# Patient Record
Sex: MALE | Race: WHITE
[De-identification: names, ages, dates, MRNs, and addresses within clinical notes are randomized per-mention and may not be internally consistent; named-entity substitution may affect disease eponyms.]

---

## 2017-01-17 ENCOUNTER — HOSPITAL ENCOUNTER (OUTPATIENT)
Dept: HOSPITAL 53 - M LAB | Age: 67
End: 2017-01-17
Attending: INTERNAL MEDICINE
Payer: COMMERCIAL

## 2017-01-17 DIAGNOSIS — L12.1: Primary | ICD-10-CM

## 2017-01-17 LAB
ALBUMIN SERPL BCG-MCNC: 3.6 GM/DL (ref 3.2–5.2)
ALP SERPL-CCNC: 103 U/L (ref 45–117)
ALT SERPL W P-5'-P-CCNC: 23 U/L (ref 12–78)
AST SERPL-CCNC: 10 U/L (ref 15–37)
BASOPHILS # BLD AUTO: 0.1 K/MM3 (ref 0–0.2)
BASOPHILS NFR BLD AUTO: 0.9 % (ref 0–1)
BUN SERPL-MCNC: 26 MG/DL (ref 7–18)
CREAT SERPL-MCNC: 1.46 MG/DL (ref 0.7–1.3)
EOSINOPHIL # BLD AUTO: 0.2 K/MM3 (ref 0–0.5)
EOSINOPHIL NFR BLD AUTO: 2.6 % (ref 0–3)
ERYTHROCYTE [DISTWIDTH] IN BLOOD BY AUTOMATED COUNT: 16.4 % (ref 11.5–14.5)
ERYTHROCYTE [SEDIMENTATION RATE] IN BLOOD BY WESTERGREN METHOD: 41 MM/HR (ref 0–20)
GFR SERPL CREATININE-BSD FRML MDRD: 51.3 ML/MIN/{1.73_M2} (ref 49–?)
LYMPHOCYTES # BLD AUTO: 3.4 K/MM3 (ref 1.5–4.5)
LYMPHOCYTES NFR BLD AUTO: 36.6 % (ref 24–44)
MCH RBC QN AUTO: 31.5 PG (ref 27–33)
MCHC RBC AUTO-ENTMCNC: 33.3 G/DL (ref 32–36.5)
MCV RBC AUTO: 94.3 FL (ref 80–96)
MONOCYTES # BLD AUTO: 0.4 K/MM3 (ref 0–0.8)
MONOCYTES NFR BLD AUTO: 3.9 % (ref 0–5)
NEUTROPHILS # BLD AUTO: 4.9 K/MM3 (ref 1.8–7.7)
NEUTROPHILS NFR BLD AUTO: 53.2 % (ref 36–66)
PLATELET # BLD AUTO: 286 K/MM3 (ref 150–450)
WBC # BLD AUTO: 9.2 K/MM3 (ref 4–10)

## 2017-04-06 ENCOUNTER — HOSPITAL ENCOUNTER (OUTPATIENT)
Dept: HOSPITAL 53 - M RAD | Age: 67
End: 2017-04-06
Attending: FAMILY MEDICINE
Payer: COMMERCIAL

## 2017-04-06 DIAGNOSIS — Z12.2: Primary | ICD-10-CM

## 2017-04-06 DIAGNOSIS — F17.210: ICD-10-CM

## 2017-04-06 NOTE — REP
LOW DOSE HELICAL SCREENING LUNG CT:

 

Low dose helical screening lung CT examination is performed in the axial plane.

 

 

Comparison is made with prior CT of the chest 01/19/2012.

 

There is diffuse emphysematous change and scattered interstitial fibrotic

scarring. In the lingular segment of the left upper lobe, inferiorly, there is a

6 mm nodular opacity with somewhat irregular borders.  This is not seen on the

prior study.  There did appear to be some minimal linear scarring at that

location on the prior study.  No other nodules are seen.  There is no pleural or

pericardial effusion. The heart is normal in size. There are degenerative changes

of the spine.

 

IMPRESSION:

 

6 mm nodular opacity in the lingular segment of the left upper lobe.  Recommend

dedicated 6 month followup CT of the chest with and without contrast.

 

 

Signed by

Vinicio Gudino MD 04/06/2017 04:53 P

## 2017-06-22 ENCOUNTER — HOSPITAL ENCOUNTER (OUTPATIENT)
Dept: HOSPITAL 53 - M LAB | Age: 67
End: 2017-06-22
Attending: INTERNAL MEDICINE
Payer: COMMERCIAL

## 2017-06-22 DIAGNOSIS — L12.1: ICD-10-CM

## 2017-06-22 DIAGNOSIS — Z51.81: Primary | ICD-10-CM

## 2017-06-22 DIAGNOSIS — Z79.899: ICD-10-CM

## 2017-06-22 LAB
ALT SERPL W P-5'-P-CCNC: 23 U/L (ref 12–78)
AST SERPL-CCNC: 19 U/L (ref 15–37)
BASOPHILS # BLD AUTO: 0 K/MM3 (ref 0–0.2)
BASOPHILS NFR BLD AUTO: 0.6 % (ref 0–1)
BUN SERPL-MCNC: 22 MG/DL (ref 7–18)
CREAT SERPL-MCNC: 1.33 MG/DL (ref 0.7–1.3)
EOSINOPHIL # BLD AUTO: 0.2 K/MM3 (ref 0–0.5)
EOSINOPHIL NFR BLD AUTO: 4.4 % (ref 0–3)
ERYTHROCYTE [DISTWIDTH] IN BLOOD BY AUTOMATED COUNT: 16.3 % (ref 11.5–14.5)
ERYTHROCYTE [SEDIMENTATION RATE] IN BLOOD BY WESTERGREN METHOD: 49 MM/HR (ref 0–20)
GFR SERPL CREATININE-BSD FRML MDRD: 57.1 ML/MIN/{1.73_M2} (ref 49–?)
LYMPHOCYTES # BLD AUTO: 2.1 K/MM3 (ref 1.5–4.5)
LYMPHOCYTES NFR BLD AUTO: 34.4 % (ref 24–44)
MCH RBC QN AUTO: 32.1 PG (ref 27–33)
MCHC RBC AUTO-ENTMCNC: 32.8 G/DL (ref 32–36.5)
MCV RBC AUTO: 98 FL (ref 80–96)
MONOCYTES # BLD AUTO: 0.4 K/MM3 (ref 0–0.8)
MONOCYTES NFR BLD AUTO: 7.6 % (ref 0–5)
NEUTROPHILS # BLD AUTO: 2.8 K/MM3 (ref 1.8–7.7)
NEUTROPHILS NFR BLD AUTO: 50.4 % (ref 36–66)
PLATELET # BLD AUTO: 274 K/MM3 (ref 150–450)
WBC # BLD AUTO: 5.6 K/MM3 (ref 4–10)

## 2017-08-25 ENCOUNTER — HOSPITAL ENCOUNTER (OUTPATIENT)
Dept: HOSPITAL 53 - M SMT | Age: 67
End: 2017-08-25
Attending: INTERNAL MEDICINE
Payer: COMMERCIAL

## 2017-08-25 DIAGNOSIS — J44.9: ICD-10-CM

## 2017-08-25 DIAGNOSIS — J84.10: ICD-10-CM

## 2017-08-25 DIAGNOSIS — R91.1: Primary | ICD-10-CM

## 2017-08-25 NOTE — REP
Chest x-ray:  Three views.

 

History:  Pulmonary nodule.

 

Comparison chest x-ray September 21, 2016.  Comparison chest CT study April 6, 2017.

 

Findings:  The lungs are hyperinflated consistent with COPD.  Interstitial

markings are diffusely prominent as before consistent with mild interstitial

fibrosis.  Heart is not enlarged.  No hilar or mediastinal mass is seen.  The 6

mm nodule in the lingular segment left upper lobe at the left lung base cannot be

seen radiographically.  CT scanning will be necessary for followup of this

nodule.  The interstitial findings are unchanged from September 21, 2016.  They

are more pronounced than on the July 30, 2014 prior study.

 

Impression:

 

Hyperinflation with diffuse interstitial lung fibrosis pattern.  No pulmonary

nodule can be visualized on plain radiographs.  CT will be necessary for followup

of the previously identified nodule.

 

 

Signed by

Primitivo Morel MD 08/25/2017 11:51 A

## 2017-10-04 ENCOUNTER — HOSPITAL ENCOUNTER (OUTPATIENT)
Dept: HOSPITAL 53 - M LAB | Age: 67
End: 2017-10-04
Attending: PHYSICIAN ASSISTANT
Payer: COMMERCIAL

## 2017-10-04 DIAGNOSIS — R79.89: Primary | ICD-10-CM

## 2017-10-04 LAB
ALBUMIN SERPL BCG-MCNC: 3.1 GM/DL (ref 3.2–5.2)
ALP HEAT LABILE SERPL HS-CCNC: 39 U/L
ALP HEAT STABLE SERPL HS-CCNC: 69 U/L
ALP SERPL-CCNC: 108 U/L (ref 45–117)
ALT SERPL W P-5'-P-CCNC: 19 U/L (ref 12–78)
AST SERPL-CCNC: 10 U/L (ref 15–37)
BUN SERPL-MCNC: 16 MG/DL (ref 7–18)
CREAT SERPL-MCNC: 1.15 MG/DL (ref 0.7–1.3)
GFR SERPL CREATININE-BSD FRML MDRD: > 60 ML/MIN/{1.73_M2} (ref 49–?)
GGT SERPL-CCNC: 15 U/L (ref 15–85)

## 2017-11-29 ENCOUNTER — HOSPITAL ENCOUNTER (OUTPATIENT)
Dept: HOSPITAL 53 - M LAB | Age: 67
End: 2017-11-29
Attending: PHYSICIAN ASSISTANT
Payer: COMMERCIAL

## 2017-11-29 DIAGNOSIS — Z79.899: ICD-10-CM

## 2017-11-29 DIAGNOSIS — L12.1: Primary | ICD-10-CM

## 2017-11-29 LAB
ALBUMIN SERPL BCG-MCNC: 3.2 GM/DL (ref 3.2–5.2)
ALP SERPL-CCNC: 125 U/L (ref 45–117)
ALT SERPL W P-5'-P-CCNC: 20 U/L (ref 12–78)
AST SERPL-CCNC: 12 U/L (ref 7–37)
BASOPHILS # BLD AUTO: 0.1 10^3/UL (ref 0–0.2)
BASOPHILS NFR BLD AUTO: 1 % (ref 0–1)
BUN SERPL-MCNC: 26 MG/DL (ref 7–18)
CREAT SERPL-MCNC: 1.22 MG/DL (ref 0.7–1.3)
EOSINOPHIL # BLD AUTO: 0.2 10^3/UL (ref 0–0.5)
EOSINOPHIL NFR BLD AUTO: 2.5 % (ref 0–3)
ERYTHROCYTE [DISTWIDTH] IN BLOOD BY AUTOMATED COUNT: 16.3 % (ref 11.5–14.5)
GFR SERPL CREATININE-BSD FRML MDRD: > 60 ML/MIN/{1.73_M2} (ref 49–?)
IMM GRANULOCYTES NFR BLD: 0.4 % (ref 0–0)
LYMPHOCYTES # BLD AUTO: 3.4 10^3/UL (ref 1.5–4.5)
LYMPHOCYTES NFR BLD AUTO: 40.9 % (ref 24–44)
MCH RBC QN AUTO: 30 PG (ref 27–33)
MCHC RBC AUTO-ENTMCNC: 32.1 G/DL (ref 32–36.5)
MCV RBC AUTO: 93.3 FL (ref 80–96)
MONOCYTES # BLD AUTO: 0.6 10^3/UL (ref 0–0.8)
MONOCYTES NFR BLD AUTO: 7.1 % (ref 0–5)
NEUTROPHILS # BLD AUTO: 4 10^3/UL (ref 1.8–7.7)
NEUTROPHILS NFR BLD AUTO: 48.1 % (ref 36–66)
NRBC BLD AUTO-RTO: 0 % (ref 0–0)
PLATELET # BLD AUTO: 281 10^3/UL (ref 150–450)
WBC # BLD AUTO: 8.3 10^3/UL (ref 4–10)

## 2017-12-01 LAB — ERYTHROCYTE [SEDIMENTATION RATE] IN BLOOD BY WESTERGREN METHOD: 24 MM/HR (ref 0–20)

## 2018-01-25 ENCOUNTER — HOSPITAL ENCOUNTER (OUTPATIENT)
Dept: HOSPITAL 53 - M SDC | Age: 68
Discharge: HOME | End: 2018-01-25
Attending: OPHTHALMOLOGY
Payer: COMMERCIAL

## 2018-01-25 DIAGNOSIS — H25.12: Primary | ICD-10-CM

## 2018-01-25 DIAGNOSIS — Z87.891: ICD-10-CM

## 2018-01-25 DIAGNOSIS — L12.1: ICD-10-CM

## 2018-01-25 DIAGNOSIS — K21.9: ICD-10-CM

## 2018-01-25 DIAGNOSIS — H35.30: ICD-10-CM

## 2018-01-25 DIAGNOSIS — Z79.82: ICD-10-CM

## 2018-01-25 DIAGNOSIS — Z88.8: ICD-10-CM

## 2018-01-25 DIAGNOSIS — R41.3: ICD-10-CM

## 2018-01-25 DIAGNOSIS — Z95.820: ICD-10-CM

## 2018-01-25 DIAGNOSIS — G47.33: ICD-10-CM

## 2018-01-25 DIAGNOSIS — Z86.73: ICD-10-CM

## 2018-01-25 DIAGNOSIS — I73.9: ICD-10-CM

## 2018-01-25 DIAGNOSIS — I11.9: ICD-10-CM

## 2018-01-25 DIAGNOSIS — E78.5: ICD-10-CM

## 2018-01-25 DIAGNOSIS — I25.84: ICD-10-CM

## 2018-01-25 DIAGNOSIS — F41.9: ICD-10-CM

## 2018-01-25 DIAGNOSIS — I25.10: ICD-10-CM

## 2018-01-25 DIAGNOSIS — Z79.899: ICD-10-CM

## 2018-01-25 DIAGNOSIS — M19.90: ICD-10-CM

## 2018-01-25 DIAGNOSIS — M54.9: ICD-10-CM

## 2018-01-25 DIAGNOSIS — F31.9: ICD-10-CM

## 2018-01-25 DIAGNOSIS — J44.9: ICD-10-CM

## 2018-01-25 PROCEDURE — 66984 XCAPSL CTRC RMVL W/O ECP: CPT

## 2018-01-25 RX ADMIN — CEFUROXIME 1 MG: 750 INJECTION, POWDER, FOR SOLUTION INTRAMUSCULAR; INTRAVENOUS at 12:21

## 2018-01-25 RX ADMIN — TROPICAMIDE 1 DROP: 10 SOLUTION/ DROPS OPHTHALMIC at 10:02

## 2018-01-25 RX ADMIN — SODIUM CHONDROITIN SULFATE / SODIUM HYALURONATE 1 EA: 0.55-0.5 INJECTION INTRAOCULAR at 12:21

## 2018-01-25 RX ADMIN — ACETYLCHOLINE CHLORIDE 1 DROP: KIT at 12:21

## 2018-01-25 RX ADMIN — POVIDONE-IODINE 1 DROP: 5 SOLUTION OPHTHALMIC at 12:21

## 2018-01-25 RX ADMIN — PHENYLEPHRINE HYDROCHLORIDE 1 DROP: 25 SOLUTION/ DROPS OPHTHALMIC at 10:01

## 2018-01-25 RX ADMIN — Medication 1 ML: at 12:21

## 2018-01-25 RX ADMIN — BALANCED SALT SOLUTION 1 ML: 6.4; .75; .48; .3; 3.9; 1.7 SOLUTION OPHTHALMIC at 12:21

## 2018-01-25 RX ADMIN — PROPARACAINE HYDROCHLORIDE 1 DROP: 5 SOLUTION/ DROPS OPHTHALMIC at 10:01

## 2018-01-25 RX ADMIN — OFLOXACIN 1 DROP: 3 SOLUTION/ DROPS OPHTHALMIC at 10:02

## 2018-02-24 ENCOUNTER — HOSPITAL ENCOUNTER (OUTPATIENT)
Dept: HOSPITAL 53 - M LAB | Age: 68
End: 2018-02-24
Payer: COMMERCIAL

## 2018-02-24 DIAGNOSIS — Z79.899: ICD-10-CM

## 2018-02-24 DIAGNOSIS — L12.1: Primary | ICD-10-CM

## 2018-02-24 LAB
ALBUMIN: 3.5 GM/DL (ref 3.2–5.2)
ALKALINE PHOSPHATASE: 108 U/L (ref 45–117)
ALT SERPL W P-5'-P-CCNC: 27 U/L (ref 12–78)
AST SERPL-CCNC: 17 U/L (ref 7–37)
BASO #: 0.1 10^3/UL (ref 0–0.2)
BASO %: 0.8 % (ref 0–1)
BLOOD UREA NITROGEN: 29 MG/DL (ref 7–18)
CREATININE FOR GFR: 1.28 MG/DL (ref 0.7–1.3)
CRP SERPL-MCNC: 0.5 MG/DL (ref 0–0.3)
EOS #: 0.3 10^3/UL (ref 0–0.5)
EOSINOPHIL NFR BLD AUTO: 2.7 % (ref 0–3)
ERYTHROCYTE SEDIMENTATION RATE: 44 MM/HR (ref 0–20)
GFR SERPL CREATININE-BSD FRML MDRD: 59.5 ML/MIN/{1.73_M2} (ref 49–?)
HEMATOCRIT: 37.2 % (ref 42–52)
HEMOGLOBIN: 11.8 G/DL (ref 14–18)
IMMATURE GRANULOCYTE #: 0 10^3/UL (ref 0–0)
IMMATURE GRANULOCYTE %: 0.4 % (ref 0–3)
LYMPH #: 2.8 10^3/UL (ref 1.5–4.5)
LYMPH %: 30.2 % (ref 24–44)
MEAN CORPUSCULAR HEMOGLOBIN: 29.9 PG (ref 27–33)
MEAN CORPUSCULAR HGB CONC: 31.7 G/DL (ref 32–36.5)
MEAN CORPUSCULAR VOLUME: 94.2 FL (ref 80–96)
MONO #: 0.5 10^3/UL (ref 0–0.8)
MONO %: 5.8 % (ref 0–5)
NEUTROPHILS #: 5.5 10^3/UL (ref 1.8–7.7)
NEUTROPHILS %: 60.1 % (ref 36–66)
NRBC BLD AUTO-RTO: 0 % (ref 0–0)
PLATELET COUNT, AUTOMATED: 261 10^3/UL (ref 150–450)
RED BLOOD COUNT: 3.95 10^6/UL (ref 4.3–6.1)
RED CELL DISTRIBUTION WIDTH: 16.8 % (ref 11.5–14.5)
WHITE BLOOD COUNT: 9.1 10^3/UL (ref 4–10)

## 2018-03-15 ENCOUNTER — HOSPITAL ENCOUNTER (OUTPATIENT)
Dept: HOSPITAL 53 - M SDC | Age: 68
Discharge: HOME | End: 2018-03-15
Attending: OPHTHALMOLOGY
Payer: MEDICARE

## 2018-03-15 DIAGNOSIS — Z86.73: ICD-10-CM

## 2018-03-15 DIAGNOSIS — H52.211: ICD-10-CM

## 2018-03-15 DIAGNOSIS — F31.9: ICD-10-CM

## 2018-03-15 DIAGNOSIS — J44.9: ICD-10-CM

## 2018-03-15 DIAGNOSIS — Z79.899: ICD-10-CM

## 2018-03-15 DIAGNOSIS — L12.1: ICD-10-CM

## 2018-03-15 DIAGNOSIS — F17.210: ICD-10-CM

## 2018-03-15 DIAGNOSIS — Z79.82: ICD-10-CM

## 2018-03-15 DIAGNOSIS — H25.11: Primary | ICD-10-CM

## 2018-03-15 DIAGNOSIS — K21.9: ICD-10-CM

## 2018-03-15 DIAGNOSIS — H17.9: ICD-10-CM

## 2018-03-15 DIAGNOSIS — E78.5: ICD-10-CM

## 2018-03-15 DIAGNOSIS — I10: ICD-10-CM

## 2018-03-15 PROCEDURE — 66984 XCAPSL CTRC RMVL W/O ECP: CPT

## 2018-03-15 RX ADMIN — BALANCED SALT SOLUTION 1 ML: 6.4; .75; .48; .3; 3.9; 1.7 SOLUTION OPHTHALMIC at 08:08

## 2018-03-15 RX ADMIN — SODIUM CHONDROITIN SULFATE / SODIUM HYALURONATE 1 EA: 0.55-0.5 INJECTION INTRAOCULAR at 08:16

## 2018-03-15 RX ADMIN — PHENYLEPHRINE HYDROCHLORIDE 1 DROP: 25 SOLUTION/ DROPS OPHTHALMIC at 06:58

## 2018-03-15 RX ADMIN — TROPICAMIDE 1 DROP: 10 SOLUTION/ DROPS OPHTHALMIC at 06:58

## 2018-03-15 RX ADMIN — Medication 1 ML: at 08:16

## 2018-03-15 RX ADMIN — CEFUROXIME 1 MG: 750 INJECTION, POWDER, FOR SOLUTION INTRAMUSCULAR; INTRAVENOUS at 06:40

## 2018-03-15 RX ADMIN — OFLOXACIN 1 DROP: 3 SOLUTION/ DROPS OPHTHALMIC at 06:57

## 2018-03-15 RX ADMIN — PROPARACAINE HYDROCHLORIDE 1 DROP: 5 SOLUTION/ DROPS OPHTHALMIC at 06:58

## 2018-04-20 ENCOUNTER — HOSPITAL ENCOUNTER (OUTPATIENT)
Dept: HOSPITAL 53 - M LAB | Age: 68
End: 2018-04-20
Attending: PHYSICIAN ASSISTANT
Payer: MEDICARE

## 2018-04-20 DIAGNOSIS — Z79.899: ICD-10-CM

## 2018-04-20 DIAGNOSIS — L12.1: Primary | ICD-10-CM

## 2018-04-20 LAB
ALBUMIN: 3.5 GM/DL (ref 3.2–5.2)
ALT SERPL W P-5'-P-CCNC: 21 U/L (ref 12–78)
AST SERPL-CCNC: 19 U/L (ref 7–37)
BASO #: 0.1 10^3/UL (ref 0–0.2)
BASO %: 0.5 % (ref 0–1)
BLOOD UREA NITROGEN: 26 MG/DL (ref 7–18)
CREATININE FOR GFR: 1.31 MG/DL (ref 0.7–1.3)
CRP SERPL-MCNC: 0.56 MG/DL (ref 0–0.3)
EOS #: 0.3 10^3/UL (ref 0–0.5)
EOSINOPHIL NFR BLD AUTO: 2.6 % (ref 0–3)
ERYTHROCYTE SEDIMENTATION RATE: 19 MM/HR (ref 0–20)
GFR SERPL CREATININE-BSD FRML MDRD: 57.9 ML/MIN/{1.73_M2} (ref 49–?)
HEMATOCRIT: 36.8 % (ref 42–52)
HEMOGLOBIN: 11.9 G/DL (ref 13.5–17.5)
IMMATURE GRANULOCYTE #: 0 10^3/UL (ref 0–0)
IMMATURE GRANULOCYTE %: 0.4 % (ref 0–3)
LYMPH #: 3.7 10^3/UL (ref 1.5–4.5)
LYMPH %: 34 % (ref 24–44)
MEAN CORPUSCULAR HEMOGLOBIN: 30.1 PG (ref 27–33)
MEAN CORPUSCULAR HGB CONC: 32.3 G/DL (ref 32–36.5)
MEAN CORPUSCULAR VOLUME: 93.2 FL (ref 80–96)
MONO #: 0.8 10^3/UL (ref 0–0.8)
MONO %: 7.4 % (ref 0–5)
NEUTROPHILS #: 6.1 10^3/UL (ref 1.8–7.7)
NEUTROPHILS %: 55.1 % (ref 36–66)
NRBC BLD AUTO-RTO: 0 % (ref 0–0)
PLATELET COUNT, AUTOMATED: 241 10^3/UL (ref 150–450)
RED BLOOD COUNT: 3.95 10^6/UL (ref 4.3–6.1)
RED CELL DISTRIBUTION WIDTH: 16.8 % (ref 11.5–14.5)
WHITE BLOOD COUNT: 11 10^3/UL (ref 4–10)

## 2018-04-20 PROCEDURE — 84460 ALANINE AMINO (ALT) (SGPT): CPT

## 2018-05-11 ENCOUNTER — HOSPITAL ENCOUNTER (OUTPATIENT)
Dept: HOSPITAL 53 - M LAB REF | Age: 68
End: 2018-05-11
Attending: OPHTHALMOLOGY
Payer: MEDICARE

## 2018-05-11 DIAGNOSIS — L72.0: Primary | ICD-10-CM

## 2018-05-11 PROCEDURE — 88304 TISSUE EXAM BY PATHOLOGIST: CPT

## 2018-05-28 ENCOUNTER — HOSPITAL ENCOUNTER (EMERGENCY)
Dept: HOSPITAL 53 - M ED | Age: 68
Discharge: HOME | End: 2018-05-28
Payer: MEDICARE

## 2018-05-28 DIAGNOSIS — Z98.890: ICD-10-CM

## 2018-05-28 DIAGNOSIS — S92.351A: Primary | ICD-10-CM

## 2018-05-28 DIAGNOSIS — G47.33: ICD-10-CM
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
## 2018-05-28 DIAGNOSIS — K21.9: ICD-10-CM

## 2018-05-28 DIAGNOSIS — Z79.899: ICD-10-CM
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
## 2018-05-28 DIAGNOSIS — Z79.82: ICD-10-CM

## 2018-05-28 DIAGNOSIS — Z88.8: ICD-10-CM

## 2018-05-28 DIAGNOSIS — E78.5: ICD-10-CM

## 2018-05-28 DIAGNOSIS — I10: ICD-10-CM

## 2018-05-28 DIAGNOSIS — J44.9: ICD-10-CM

## 2018-05-28 PROCEDURE — 73630 X-RAY EXAM OF FOOT: CPT

## 2018-06-28 ENCOUNTER — HOSPITAL ENCOUNTER (OUTPATIENT)
Dept: HOSPITAL 53 - M LAB REF | Age: 68
End: 2018-06-28
Attending: PHYSICIAN ASSISTANT
Payer: MEDICARE

## 2018-06-28 ENCOUNTER — HOSPITAL ENCOUNTER (EMERGENCY)
Dept: HOSPITAL 53 - M ED | Age: 68
Discharge: HOME | End: 2018-06-28
Payer: MEDICARE

## 2018-06-28 DIAGNOSIS — Z79.82: ICD-10-CM

## 2018-06-28 DIAGNOSIS — S20.211A: Primary | ICD-10-CM

## 2018-06-28 DIAGNOSIS — Z79.899: ICD-10-CM
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
## 2018-06-28 DIAGNOSIS — J44.9: ICD-10-CM

## 2018-06-28 DIAGNOSIS — I10: ICD-10-CM

## 2018-06-28 DIAGNOSIS — L12.1: ICD-10-CM

## 2018-06-28 DIAGNOSIS — K21.9: ICD-10-CM

## 2018-06-28 DIAGNOSIS — F41.9: ICD-10-CM

## 2018-06-28 DIAGNOSIS — M54.9: ICD-10-CM

## 2018-06-28 DIAGNOSIS — E78.00: ICD-10-CM

## 2018-06-28 DIAGNOSIS — F32.9: ICD-10-CM

## 2018-06-28 DIAGNOSIS — Z51.81: Primary | ICD-10-CM
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
## 2018-06-28 LAB
ALBUMIN: 3.8 GM/DL (ref 3.2–5.2)
ALT SERPL W P-5'-P-CCNC: 18 U/L (ref 12–78)
APPEARANCE, URINE: CLEAR
AST SERPL-CCNC: 17 U/L (ref 7–37)
BACTERIA UR QL AUTO: NEGATIVE
BASO #: 0.1 10^3/UL (ref 0–0.2)
BASO %: 0.9 % (ref 0–1)
BILIRUBIN, URINE AUTO: NEGATIVE
BLOOD UREA NITROGEN: 25 MG/DL (ref 7–18)
BLOOD, URINE BLOOD: NEGATIVE
CREATININE FOR GFR: 1.85 MG/DL (ref 0.7–1.3)
CRP SERPL-MCNC: 0.83 MG/DL (ref 0–0.3)
EOS #: 0.2 10^3/UL (ref 0–0.5)
EOSINOPHIL NFR BLD AUTO: 3.1 % (ref 0–3)
ERYTHROCYTE SEDIMENTATION RATE: 32 MM/HR (ref 0–20)
GFR SERPL CREATININE-BSD FRML MDRD: 38.9 ML/MIN/{1.73_M2} (ref 49–?)
GLUCOSE, URINE (UA) AUTO: NEGATIVE MG/DL
HEMATOCRIT: 36.6 % (ref 42–52)
HEMOGLOBIN: 11.9 G/DL (ref 13.5–17.5)
IMMATURE GRANULOCYTE #: 0 10^3/UL (ref 0–0)
IMMATURE GRANULOCYTE %: 0.3 % (ref 0–3)
KETONE, URINE AUTO: (no result) MG/DL
LEUKOCYTE ESTERASE UR QL STRIP.AUTO: NEGATIVE
LYMPH #: 2.5 10^3/UL (ref 1.5–4.5)
LYMPH %: 36.5 % (ref 24–44)
MEAN CORPUSCULAR HEMOGLOBIN: 31.1 PG (ref 27–33)
MEAN CORPUSCULAR HGB CONC: 32.5 G/DL (ref 32–36.5)
MEAN CORPUSCULAR VOLUME: 95.6 FL (ref 80–96)
MONO #: 0.6 10^3/UL (ref 0–0.8)
MONO %: 9.3 % (ref 0–5)
NEUTROPHILS #: 3.4 10^3/UL (ref 1.8–7.7)
NEUTROPHILS %: 49.9 % (ref 36–66)
NITRITE, URINE AUTO: NEGATIVE
NRBC BLD AUTO-RTO: 0 % (ref 0–0)
PH,URINE: 5 UNITS (ref 5–9)
PLATELET COUNT, AUTOMATED: 239 10^3/UL (ref 150–450)
PROT UR QL STRIP.AUTO: NEGATIVE MG/DL
RBC, URINE AUTO: 0 /HPF (ref 0–3)
RED BLOOD COUNT: 3.83 10^6/UL (ref 4.3–6.1)
RED CELL DISTRIBUTION WIDTH: 17 % (ref 11.5–14.5)
SPECIFIC GRAVITY URINE AUTO: 1.01 (ref 1–1.03)
SQUAMOUS #/AREA URNS AUTO: 0 /HPF (ref 0–6)
UROBILINOGEN, URINE AUTO: 0.2 MG/DL (ref 0–2)
WBC, URINE AUTO: 1 /HPF (ref 0–3)
WHITE BLOOD COUNT: 6.9 10^3/UL (ref 4–10)

## 2018-06-28 PROCEDURE — 84460 ALANINE AMINO (ALT) (SGPT): CPT

## 2018-06-28 PROCEDURE — 71101 X-RAY EXAM UNILAT RIBS/CHEST: CPT

## 2018-06-28 RX ADMIN — TRAMADOL HYDROCHLORIDE 1 MG: 50 TABLET, COATED ORAL at 21:44

## 2018-07-03 ENCOUNTER — HOSPITAL ENCOUNTER (EMERGENCY)
Dept: HOSPITAL 53 - M ED | Age: 68
Discharge: HOME | End: 2018-07-03
Payer: MEDICARE

## 2018-07-03 DIAGNOSIS — Z98.890: ICD-10-CM

## 2018-07-03 DIAGNOSIS — Z88.8: ICD-10-CM

## 2018-07-03 DIAGNOSIS — K21.9: ICD-10-CM

## 2018-07-03 DIAGNOSIS — Z79.899: ICD-10-CM

## 2018-07-03 DIAGNOSIS — D63.1: ICD-10-CM

## 2018-07-03 DIAGNOSIS — N18.9: ICD-10-CM

## 2018-07-03 DIAGNOSIS — R19.7: Primary | ICD-10-CM

## 2018-07-03 DIAGNOSIS — F17.200: ICD-10-CM

## 2018-07-03 DIAGNOSIS — E78.00: ICD-10-CM

## 2018-07-03 DIAGNOSIS — J44.9: ICD-10-CM

## 2018-07-03 DIAGNOSIS — I12.9: ICD-10-CM

## 2018-07-03 LAB
ALBUMIN/GLOBULIN RATIO: 1 (ref 1–1.93)
ALBUMIN: 3.4 GM/DL (ref 3.2–5.2)
ALKALINE PHOSPHATASE: 77 U/L (ref 45–117)
ALT SERPL W P-5'-P-CCNC: 22 U/L (ref 12–78)
ANION GAP: 6 MEQ/L (ref 8–16)
AST SERPL-CCNC: 20 U/L (ref 7–37)
BASO #: 0.1 10^3/UL (ref 0–0.2)
BASO %: 0.6 % (ref 0–1)
BILIRUB CONJ SERPL-MCNC: 0.1 MG/DL (ref 0–0.2)
BILIRUBIN,TOTAL: 0.3 MG/DL (ref 0.2–1)
BLOOD UREA NITROGEN: 22 MG/DL (ref 7–18)
CALCIUM LEVEL: 8.7 MG/DL (ref 8.8–10.2)
CARBON DIOXIDE LEVEL: 26 MEQ/L (ref 21–32)
CHLORIDE LEVEL: 113 MEQ/L (ref 98–107)
CREATININE FOR GFR: 1.56 MG/DL (ref 0.7–1.3)
EOS #: 0.1 10^3/UL (ref 0–0.5)
EOSINOPHIL NFR BLD AUTO: 1.8 % (ref 0–3)
GFR SERPL CREATININE-BSD FRML MDRD: 47.4 ML/MIN/{1.73_M2} (ref 49–?)
GLUCOSE, FASTING: 82 MG/DL (ref 70–100)
HEMATOCRIT: 35 % (ref 42–52)
HEMOGLOBIN: 11.6 G/DL (ref 13.5–17.5)
IMMATURE GRANULOCYTE %: 0.4 % (ref 0–3)
LEUKOCYTE ESTERASE UR AUTO RFX: NEGATIVE
LIPASE: 170 U/L (ref 73–393)
LYMPH #: 2.1 10^3/UL (ref 1.5–4.5)
LYMPH %: 26.7 % (ref 24–44)
MEAN CORPUSCULAR HEMOGLOBIN: 31.1 PG (ref 27–33)
MEAN CORPUSCULAR HGB CONC: 33.1 G/DL (ref 32–36.5)
MEAN CORPUSCULAR VOLUME: 93.8 FL (ref 80–96)
MONO #: 0.6 10^3/UL (ref 0–0.8)
MONO %: 7.5 % (ref 0–5)
NEUTROPHILS #: 4.9 10^3/UL (ref 1.8–7.7)
NEUTROPHILS %: 63 % (ref 36–66)
NRBC BLD AUTO-RTO: 0 % (ref 0–0)
PLATELET COUNT, AUTOMATED: 256 10^3/UL (ref 150–450)
POTASSIUM SERUM: 4.7 MEQ/L (ref 3.5–5.1)
RED BLOOD COUNT: 3.73 10^6/UL (ref 4.3–6.1)
RED CELL DISTRIBUTION WIDTH: 17.1 % (ref 11.5–14.5)
SODIUM LEVEL: 145 MEQ/L (ref 136–145)
SPECIFIC GRAVITY UR AUTO RFX: 1.03 (ref 1–1.03)
SQUAM EPITHELIAL CELL UR AURFX: 0 /HPF (ref 0–6)
TOTAL PROTEIN: 6.8 GM/DL (ref 6.4–8.2)
WHITE BLOOD COUNT: 7.8 10^3/UL (ref 4–10)

## 2018-07-03 PROCEDURE — 83690 ASSAY OF LIPASE: CPT

## 2018-07-03 RX ADMIN — SODIUM CHLORIDE 1 MLS/HR: 9 INJECTION, SOLUTION INTRAVENOUS at 13:21
